# Patient Record
Sex: FEMALE | Race: WHITE | NOT HISPANIC OR LATINO | Employment: PART TIME | URBAN - METROPOLITAN AREA
[De-identification: names, ages, dates, MRNs, and addresses within clinical notes are randomized per-mention and may not be internally consistent; named-entity substitution may affect disease eponyms.]

---

## 2022-04-30 ENCOUNTER — APPOINTMENT (EMERGENCY)
Dept: RADIOLOGY | Facility: HOSPITAL | Age: 52
End: 2022-04-30

## 2022-04-30 ENCOUNTER — HOSPITAL ENCOUNTER (EMERGENCY)
Facility: HOSPITAL | Age: 52
Discharge: HOME | End: 2022-04-30
Attending: EMERGENCY MEDICINE | Admitting: EMERGENCY MEDICINE

## 2022-04-30 VITALS
HEART RATE: 58 BPM | OXYGEN SATURATION: 99 % | SYSTOLIC BLOOD PRESSURE: 98 MMHG | DIASTOLIC BLOOD PRESSURE: 59 MMHG | RESPIRATION RATE: 18 BRPM | TEMPERATURE: 98.3 F

## 2022-04-30 DIAGNOSIS — M25.552 LEFT HIP PAIN: Primary | ICD-10-CM

## 2022-04-30 PROCEDURE — 99284 EMERGENCY DEPT VISIT MOD MDM: CPT | Performed by: EMERGENCY MEDICINE

## 2022-04-30 PROCEDURE — 99283 EMERGENCY DEPT VISIT LOW MDM: CPT

## 2022-04-30 PROCEDURE — 73502 X-RAY EXAM HIP UNI 2-3 VIEWS: CPT

## 2022-04-30 RX ORDER — KETOROLAC TROMETHAMINE 30 MG/ML
15 INJECTION, SOLUTION INTRAMUSCULAR; INTRAVENOUS ONCE
Status: DISCONTINUED | OUTPATIENT
Start: 2022-04-30 | End: 2022-04-30 | Stop reason: HOSPADM

## 2022-04-30 RX ORDER — IBUPROFEN 600 MG/1
600 TABLET ORAL ONCE
Status: COMPLETED | OUTPATIENT
Start: 2022-04-30 | End: 2022-04-30

## 2022-04-30 RX ADMIN — IBUPROFEN 600 MG: 600 TABLET ORAL at 19:48

## 2022-04-30 NOTE — ED PROVIDER NOTES
History  Chief Complaint   Patient presents with    Hip Pain     pt presents ambulatory with c/o L hip pain x 6 months, worse today  denies dysuria, nausea     64-year-old female with no significant past medical history presents with left hip pain  Patient reports that she has been having this pain for 6 months  She had no inciting injury or event that started this pain  Patient reports that she does not do heavy lifting  She is not on her feet too often  She has no pain radiating anywhere else  She states that the pain is constant  No numbness or tingling in the legs  Patient has no low back pain  Patient states that movement makes the pain worse  She has not tried any over-the-counter medications for her symptoms  Patient has not seen any other doctor about this or had any kind of imaging yet  Patient cannot think of any reason that made her decide to come in today in particular  None       History reviewed  No pertinent past medical history  Past Surgical History:   Procedure Laterality Date    APPENDECTOMY         History reviewed  No pertinent family history  I have reviewed and agree with the history as documented  E-Cigarette/Vaping     E-Cigarette/Vaping Substances     Social History     Tobacco Use    Smoking status: Never Smoker    Smokeless tobacco: Never Used   Substance Use Topics    Alcohol use: Not Currently    Drug use: Not Currently        Review of Systems   Constitutional: Negative for chills, fatigue and fever  HENT: Negative for congestion, rhinorrhea and sore throat  Eyes: Negative for pain and redness  Respiratory: Negative for cough, chest tightness, shortness of breath and wheezing  Cardiovascular: Negative for chest pain and palpitations  Gastrointestinal: Negative for abdominal pain, diarrhea, nausea and vomiting  Endocrine: Negative  Genitourinary: Negative for difficulty urinating and hematuria     Musculoskeletal: Positive for arthralgias  Negative for back pain, gait problem and myalgias  Skin: Negative for pallor and rash  Allergic/Immunologic: Negative  Neurological: Negative for dizziness, weakness, light-headedness and headaches  Hematological: Negative  Physical Exam  ED Triage Vitals   Temperature Pulse Respirations Blood Pressure SpO2   04/30/22 1927 04/30/22 1927 04/30/22 1927 04/30/22 1927 04/30/22 1927   98 3 °F (36 8 °C) 58 18 98/59 99 %      Temp Source Heart Rate Source Patient Position - Orthostatic VS BP Location FiO2 (%)   04/30/22 1927 04/30/22 1927 -- -- --   Oral Monitor         Pain Score       04/30/22 1943       6             Orthostatic Vital Signs  Vitals:    04/30/22 1927   BP: 98/59   Pulse: 58       Physical Exam  Vitals and nursing note reviewed  Constitutional:       General: She is not in acute distress  Appearance: Normal appearance  She is not ill-appearing  HENT:      Head: Normocephalic and atraumatic  Eyes:      Conjunctiva/sclera: Conjunctivae normal    Cardiovascular:      Rate and Rhythm: Normal rate and regular rhythm  Pulmonary:      Effort: Pulmonary effort is normal  No respiratory distress  Musculoskeletal:         General: Normal range of motion  Cervical back: Normal range of motion and neck supple  Comments: No pain over the lumbar spine  Normal range of motion of the lumbar spine  Patient has pain over the left iliac crest and ASIS  No pain over the greater trochanter of the femur  No groin pain  Patient has no pain with range of motion of the hip  Right hip normal    Skin:     General: Skin is warm and dry  Neurological:      General: No focal deficit present  Mental Status: She is alert and oriented to person, place, and time  Sensory: No sensory deficit  Motor: No weakness           ED Medications  Medications   ibuprofen (MOTRIN) tablet 600 mg (600 mg Oral Given 4/30/22 1948)       Diagnostic Studies  Results Reviewed     None XR hip/pelv 2-3 vws left   ED Interpretation by Benjamin Lin DO (04/30 2059)   No acute fracture      Final Result by Isha Capellan MD (05/01 1054)      No acute osseous abnormality  Workstation performed: [id number]               Procedures  Procedures      ED Course  ED Course as of 05/03/22 1549   Sat Apr 30, 2022 1945 Pt refused toradol                             SBIRT 22yo+      Most Recent Value   SBIRT (24 yo +)    In order to provide better care to our patients, we are screening all of our patients for alcohol and drug use  Would it be okay to ask you these screening questions? No Filed at: 04/30/2022 1947                [facility]  Number of Diagnoses or Management Options  Left hip pain: established and improving  Diagnosis management comments: 52-year-old female presents with left hip pain  Will try NSAIDs and get an x-ray  Patient will likely need to follow-up with orthopedics  Amount and/or Complexity of Data Reviewed  Tests in the radiology section of CPT®: ordered and reviewed  Review and summarize past medical records: yes  Discuss the patient with other providers: yes    Risk of Complications, Morbidity, and/or Mortality  Presenting problems: minimal  Diagnostic procedures: minimal  Management options: minimal    Patient Progress  Patient progress: improved    Patient felt better after medications  Recommend follow up with Orthopedics  Continue NSAIDs  Return precautions given  All questions answered  Disposition  Final diagnoses:   Left hip pain     Time reflects when diagnosis was documented in both MDM as applicable and the Disposition within this note     Time User Action Codes Description Comment    4/30/2022  8:59 PM Qian Mills Add [M25 552] Left hip pain       ED Disposition     ED Disposition Condition Date/Time Comment    Discharge Good Sat Apr 30, 2022  8:59 PM Joselyn Herrera discharge to home/self care              Follow-up Information Follow up With Specialties Details Why Contact Info Additional [address] [facility]   [address] [id number] [address], [address] [place] [address], [address]  Use Entrance A           There are no discharge medications for this patient  No discharge procedures on file  PDMP Review     None           ED Provider  Attending physically available and evaluated Yohana Chiu I managed the patient along with the ED Attending      Electronically Signed by         Tucker Gamble DO  05/03/22 5464

## 2022-04-30 NOTE — ED ATTENDING ATTESTATION
4/30/2022  ILeon MD, saw and evaluated the patient  I have discussed the patient with the resident/non-physician practitioner and agree with the resident's/non-physician practitioner's findings, Plan of Care, and MDM as documented in the resident's/non-physician practitioner's note, except where noted  All available labs and Radiology studies were reviewed  I was present for key portions of any procedure(s) performed by the resident/non-physician practitioner and I was immediately available to provide assistance  At this point I agree with the current assessment done in the Emergency Department  I have conducted an independent evaluation of this patient a history and physical is as follows:    6m of L hip pain  Over ASIS, no meds  Decided to come today for eval - no acute exacerbation  No neurologic deficits  VS and nursing notes reviewed  General: Appears in NAD, awake, alert, speaking normally in full sentences  Well-nourished, well-developed  Appears stated age  Head: Normocephalic, atraumatic  Eyes: EOMI  Vision grossly normal  No subconjunctival hemorrhages or occular discharge noted  Symmetrical lids  ENT: Atraumatic external nose and ears  No stridor  Normal phonation  No drooling  Normal swallowing  Neck: No JVD  FROM  No goiter  CV: No pallor  Normal rate  Lungs: No tachypnea  No respiratory distress  MSK: Moving all extremities equally, no peripheral edema tenderness over the L iliac crest, pain with ROM of the back  Skin: Dry, intact  No cyanosis  Neuro: Awake, alert, GCS15  CN II-XII grossly intact  Grossly normal gait  Psychiatric/Behavioral: Appropriate mood and affect  A/P: This is a 49 y o  female who presents to the ED for evaluation of lateral pelvic pain  It is not unstable, there is no hip pain  NSAIDs, XR, ortho      ED Course         Critical Care Time  Procedures

## 2022-05-01 NOTE — DISCHARGE INSTRUCTIONS
You have been seen for hip pain hip pain  You should return to the ED if you develop inability to walk or other worsening symptoms  Follow up with Orthopedics  Take NSAIDs such as Motrin, Aleve, or Advil for the pain

## 2022-05-02 ENCOUNTER — HOSPITAL ENCOUNTER (EMERGENCY)
Facility: HOSPITAL | Age: 52
Discharge: HOME | End: 2022-05-03
Attending: EMERGENCY MEDICINE

## 2022-05-02 VITALS
HEART RATE: 91 BPM | RESPIRATION RATE: 20 BRPM | DIASTOLIC BLOOD PRESSURE: 71 MMHG | SYSTOLIC BLOOD PRESSURE: 132 MMHG | OXYGEN SATURATION: 100 %

## 2022-05-02 DIAGNOSIS — L02.91 ABSCESS: Primary | ICD-10-CM

## 2022-05-02 PROCEDURE — 99282 EMERGENCY DEPT VISIT SF MDM: CPT

## 2022-05-02 PROCEDURE — 99284 EMERGENCY DEPT VISIT MOD MDM: CPT | Performed by: EMERGENCY MEDICINE

## 2022-05-02 RX ORDER — CEPHALEXIN 500 MG/1
500 CAPSULE ORAL EVERY 6 HOURS SCHEDULED
Qty: 28 CAPSULE | Refills: 0 | Status: SHIPPED | OUTPATIENT
Start: 2022-05-02 | End: 2022-05-09

## 2022-05-02 RX ORDER — SULFAMETHOXAZOLE AND TRIMETHOPRIM 800; 160 MG/1; MG/1
1 TABLET ORAL ONCE
Status: COMPLETED | OUTPATIENT
Start: 2022-05-02 | End: 2022-05-02

## 2022-05-02 RX ORDER — CEPHALEXIN 500 MG/1
500 CAPSULE ORAL ONCE
Status: COMPLETED | OUTPATIENT
Start: 2022-05-02 | End: 2022-05-02

## 2022-05-02 RX ORDER — SULFAMETHOXAZOLE AND TRIMETHOPRIM 800; 160 MG/1; MG/1
1 TABLET ORAL 2 TIMES DAILY
Qty: 14 TABLET | Refills: 0 | Status: SHIPPED | OUTPATIENT
Start: 2022-05-02 | End: 2022-05-09

## 2022-05-02 RX ADMIN — SULFAMETHOXAZOLE AND TRIMETHOPRIM 1 TABLET: 800; 160 TABLET ORAL at 23:36

## 2022-05-02 RX ADMIN — CEPHALEXIN 500 MG: 500 CAPSULE ORAL at 23:36

## 2022-05-02 NOTE — Clinical Note
Yohana Rosasmajor was seen and treated in our emergency department on 5/2/2022  Diagnosis:     Cristino Cooper  may return to work on return date  She may return on this date: 05/05/2022         If you have any questions or concerns, please don't hesitate to call        Saida Garcia PA-C    ______________________________           _______________          _______________  Hospital Representative                              Date                                Time

## 2022-05-03 NOTE — DISCHARGE INSTRUCTIONS
You were seen in the ED for abscess  Return to the ED for any worsening symptoms or new symptoms  Follow up with your primary care doctor as soon as possible

## 2022-05-03 NOTE — ED PROVIDER NOTES
History  Chief Complaint   Patient presents with    Cyst     patient reports having a cyst to right lower abdomen  started with yellow drainage today  redness around site for three days  78-year-old female patient presenting with skin abscess on right abdomen onset 3 days ago  Patient states that she noticed a "black dot" with swelling 3 days ago  Patient states that her son tried to squeeze without any drainage  Patient states erythema swelling worsened and has been spreading  States started draining serous drainage today  Patient states worsening pain  Denies taking anything for the pain  Patient is unsure how she initially got the initial wound  Denies chest pain, fevers, shortness of breath, nausea, vomiting, chills  None       History reviewed  No pertinent past medical history  Past Surgical History:   Procedure Laterality Date    APPENDECTOMY         History reviewed  No pertinent family history  I have reviewed and agree with the history as documented  E-Cigarette/Vaping     E-Cigarette/Vaping Substances     Social History     Tobacco Use    Smoking status: Never Smoker    Smokeless tobacco: Never Used   Substance Use Topics    Alcohol use: Not Currently    Drug use: Not Currently        Review of Systems   Constitutional: Negative for chills, fatigue and fever  HENT: Negative for congestion, rhinorrhea and sore throat  Respiratory: Negative for cough, chest tightness and shortness of breath  Cardiovascular: Negative for chest pain and leg swelling  Gastrointestinal: Negative for abdominal distention, abdominal pain, diarrhea, nausea and vomiting  Genitourinary: Negative for difficulty urinating and dysuria  Musculoskeletal: Negative for arthralgias, back pain and myalgias  Skin: Positive for wound (Right abdominal abscess with surrounding cellulitis  )  Negative for rash  Neurological: Negative for dizziness, weakness and headaches     All other systems reviewed and are negative  Physical Exam  ED Triage Vitals [05/02/22 2202]   Temp Pulse Respirations Blood Pressure SpO2   -- 91 20 132/71 100 %      Temp src Heart Rate Source Patient Position - Orthostatic VS BP Location FiO2 (%)   -- Monitor Sitting Left arm --      Pain Score       10 - Worst Possible Pain             Orthostatic Vital Signs  Vitals:    05/02/22 2202   BP: 132/71   Pulse: 91   Patient Position - Orthostatic VS: Sitting       Physical Exam  Vitals reviewed  Constitutional:       Appearance: Normal appearance  HENT:      Head: Normocephalic and atraumatic  Nose: Nose normal       Mouth/Throat:      Mouth: Mucous membranes are moist       Pharynx: Oropharynx is clear  Eyes:      Extraocular Movements: Extraocular movements intact  Conjunctiva/sclera: Conjunctivae normal    Cardiovascular:      Rate and Rhythm: Normal rate and regular rhythm  Pulses: Normal pulses  Heart sounds: Normal heart sounds  Pulmonary:      Effort: Pulmonary effort is normal       Breath sounds: Normal breath sounds  Abdominal:      General: Bowel sounds are normal       Palpations: Abdomen is soft  Tenderness: There is no abdominal tenderness  Musculoskeletal:         General: Swelling (swelling of wound) and tenderness present  Normal range of motion  Cervical back: Normal range of motion  Skin:     General: Skin is warm and dry  Findings: Erythema (erythema surrounding abscess with indurated skin) and lesion (right abdominal skin abscess with mild serous drainage 3 cm with surrunding indurated erythema spanning to midline  ) present  Neurological:      General: No focal deficit present  Mental Status: She is alert and oriented to person, place, and time  Mental status is at baseline           ED Medications  Medications   sulfamethoxazole-trimethoprim (BACTRIM DS) 800-160 mg per tablet 1 tablet (1 tablet Oral Given 5/2/22 4503)   cephalexin (KEFLEX) capsule 500 mg (500 mg Oral Given 5/2/22 2336)       Diagnostic Studies  Results Reviewed     None                 No orders to display         Procedures  Procedures      ED Course  ED Course as of 05/03/22 0252   Mon May 02, 2022   2336 Patient refusing I & D, does not want  Spoke with patient about risks of not getting I & D and possibility of worsening infection and sepsis  Patient still refusing                                       MDM  Number of Diagnoses or Management Options  Abscess  Diagnosis management comments: 81-year-old female patient presenting with skin abscess on right abdomen  No fevers  Started draining today  On exam, patient has cm skin abscess with serous drainage with surrounding erythema and induration tracking to midline  Spoke with patient about I&D of abscess however patient declining  Spoke with patient about risks such as sepsis, death however patient declining I&D and wants antibiotics  Patient treated with Bactrim and Keflex and discharged with Bactrim and Keflex  Spoke with patient about return precautions such as worsening infection, fevers  Patient understands the risks of worsening infection without I and D and agrees to come back if worsens  Amount and/or Complexity of Data Reviewed  Clinical lab tests: ordered and reviewed        Disposition  Final diagnoses:   Abscess     Time reflects when diagnosis was documented in both MDM as applicable and the Disposition within this note     Time User Action Codes Description Comment    5/2/2022 11:54 PM Kitty ZULUAGA [facility] Add [L02 91] Abscess       ED Disposition     ED Disposition Condition Date/Time Comment    Discharge Stable Mon May 2, 2022 11:54 PM Esteban Postin discharge to home/self care              Follow-up Information     Follow up With Specialties Details Why Contact Info Additional [address]  [facility] Schedule an appointment as soon as possible for a visit   [address] [phone]  [facility], [address] [place], [place], [facility]          Discharge Medication List as of 5/2/2022 11:58 PM      START taking these medications    Details   cephalexin (KEFLEX) 500 mg capsule Take 1 capsule (500 mg total) by mouth every 6 (six) hours for 7 days, Starting Mon 5/2/2022, Until Mon 5/9/2022, Normal      sulfamethoxazole-trimethoprim (BACTRIM DS) 800-160 mg per tablet Take 1 tablet by mouth 2 (two) times a day for 7 days smx-tmp DS (BACTRIM) 800-160 mg tabs (1tab q12 D10), Starting Mon 5/2/2022, Until Mon 5/9/2022, Normal               PDMP Review     None           ED Provider  Attending physically available and evaluated Ofe Olvera I managed the patient along with the ED Attending      Electronically Signed by         Chen Mandujano MD  05/03/22 2986

## 2022-05-04 NOTE — ED ATTENDING ATTESTATION
5/2/2022  IJessica DO, saw and evaluated the patient  I have discussed the patient with the resident/non-physician practitioner and agree with the resident's/non-physician practitioner's findings, Plan of Care, and MDM as documented in the resident's/non-physician practitioner's note, except where noted  All available labs and Radiology studies were reviewed  I was present for key portions of any procedure(s) performed by the resident/non-physician practitioner and I was immediately available to provide assistance  At this point I agree with the current assessment done in the Emergency Department  I have conducted an independent evaluation of this patient a history and physical is as follows:    49 y o  female presents abscess on abdomen, right lower quadrant  Onset  days  Described as initially a black dot, now swelling  Erythema, indurated  Draining somewhat Denies f/c  No other modifying factors  Moderate severity  No other associated symptoms  Normal physical exam other than pertinent findings below  Large indurated area c/w abscess of the abdominal wall, no peritoneal irritation        A/P: Abscess, needs I/D but pt multiple times refused and wanted to try abx first even though I informed her that this would likely not result in resolution   She agrees to come back if her sx worsen                    ED Course         Critical Care Time  Procedures

## 2023-07-01 ENCOUNTER — HOSPITAL ENCOUNTER (EMERGENCY)
Facility: HOSPITAL | Age: 53
Discharge: HOME | End: 2023-07-01
Attending: EMERGENCY MEDICINE | Admitting: EMERGENCY MEDICINE
Payer: COMMERCIAL

## 2023-07-01 VITALS
DIASTOLIC BLOOD PRESSURE: 57 MMHG | SYSTOLIC BLOOD PRESSURE: 118 MMHG | RESPIRATION RATE: 18 BRPM | OXYGEN SATURATION: 98 % | TEMPERATURE: 98.6 F | HEART RATE: 98 BPM

## 2023-07-01 DIAGNOSIS — L03.317 CELLULITIS AND ABSCESS OF BUTTOCK: Primary | ICD-10-CM

## 2023-07-01 DIAGNOSIS — L02.31 CELLULITIS AND ABSCESS OF BUTTOCK: Primary | ICD-10-CM

## 2023-07-01 PROCEDURE — 99282 EMERGENCY DEPT VISIT SF MDM: CPT

## 2023-07-01 RX ORDER — CEPHALEXIN 500 MG/1
500 CAPSULE ORAL ONCE
Status: COMPLETED | OUTPATIENT
Start: 2023-07-01 | End: 2023-07-01

## 2023-07-01 RX ORDER — SULFAMETHOXAZOLE AND TRIMETHOPRIM 800; 160 MG/1; MG/1
1 TABLET ORAL ONCE
Status: COMPLETED | OUTPATIENT
Start: 2023-07-01 | End: 2023-07-01

## 2023-07-01 RX ORDER — CEPHALEXIN 500 MG/1
500 CAPSULE ORAL EVERY 6 HOURS SCHEDULED
Qty: 28 CAPSULE | Refills: 0 | Status: SHIPPED | OUTPATIENT
Start: 2023-07-01 | End: 2023-07-08

## 2023-07-01 RX ORDER — SULFAMETHOXAZOLE AND TRIMETHOPRIM 800; 160 MG/1; MG/1
1 TABLET ORAL 2 TIMES DAILY
Qty: 14 TABLET | Refills: 0 | Status: SHIPPED | OUTPATIENT
Start: 2023-07-01 | End: 2023-07-08

## 2023-07-01 RX ADMIN — SULFAMETHOXAZOLE AND TRIMETHOPRIM 1 TABLET: 800; 160 TABLET ORAL at 17:34

## 2023-07-01 RX ADMIN — CEPHALEXIN 500 MG: 500 CAPSULE ORAL at 17:34

## 2023-07-01 NOTE — DISCHARGE INSTRUCTIONS
Please return to the ER for re-evaluation in 48-72 hours. As discussed you REALLY should have it incised and drained (scalpel to open). Please do sitz baths as discussed. Please return for worsening symptoms, expansion of redness.

## 2023-07-01 NOTE — Clinical Note
Dolroes Shah was seen and treated in our emergency department on 7/1/2023. No restrictions            Diagnosis: Skin evaluation    Mary Manzo  . She may return on this date: 07/03/2023         If you have any questions or concerns, please don't hesitate to call.       Vilma Lemon MD    ______________________________           _______________          _______________  Hospital Representative                              Date                                Time

## 2023-07-01 NOTE — ED ATTENDING ATTESTATION
Final Diagnoses:     1. Cellulitis and abscess of buttock           I, Willene Prader MD, saw and evaluated the patient. All available labs and X-rays were ordered by me or the resident / non-physician and have been reviewed by myself. I discussed the patient with the resident / non-physician and agree with the resident's / non-physician practitioner's findings and plan as documented in the resident's / non-physician practicitioner's note, except where noted. At this point, I agree with the current assessment done in the ED. I was present during key portions of all procedures performed unless otherwise stated. Nursing Triage:     Chief Complaint   Patient presents with   • Spider Bite     Per pt bite on right side of buttocks. Pt reports chills. She was at work on Tuesday and felt a bite. She reports 2 spots with hardness around the area       HPI:   This is a 47 y.o. female presenting for evaluation of "spider bite"  Sharp pain on right buttock, thought something bit her there. It's gotten larger. Gotten red/hot. Localized discomfort. No drainage. ASSESSMENT + PLAN:   Abscess w/ cellulitis:  - U/S to see if valid site for I&D ? images saved in PACS; done by myself and resident ? obvious abscess present. - Abx for staph given high suspicion given purulence. - She refused I&D. Had extensive discussion about why this is not ideal  - RTER 48 hours  - DC. Physical:   General: VS reviewed  Appears in NAD  awake, alert. Well-nourished, well-developed. Appears stated age. Speaking normally in full sentences. Head: Normocephalic, atraumatic  Eyes: EOM-I. No diplopia. No hyphema. No subconjunctival hemorrhages. Symmetrical lids. ENT: Atraumatic external nose and ears. MMM  No malocclusion. No stridor. Normal phonation. No drooling. Normal swallowing. Neck: No JVD. CV: No pallor noted  Lungs:   No tachypnea  No respiratory distress  MSK:   FROM spontaneously  Skin: Dry, intact. Right buttock 12cm area of red, firm, indurated. Neuro: Awake, alert, GCS15, CN II-XII grossly intact. Motor grossly intact. Psychiatric/Behavioral: interacting normally; appropriate mood/affect.  Exam: deferred    Vitals:    07/01/23 1552   BP: 118/57   BP Location: Right arm   Pulse: 98   Resp: 18   Temp: 98.6 °F (37 °C)   TempSrc: Temporal   SpO2: 98%     - There are no obvious limitations to social determinants of care. - Nursing note reviewed. - Vitals reviewed. - Orders placed by myself and/or advanced practitioner / resident.    - Previous chart was reviewed  - No language barrier.   - History obtained from patient. - There are no limitations to the history obtained:     Past Medical:    has no past medical history on file. Past Surgical:    has a past surgical history that includes Appendectomy. Social:     Social History     Substance and Sexual Activity   Alcohol Use Not Currently     Social History     Tobacco Use   Smoking Status Never   Smokeless Tobacco Never     Social History     Substance and Sexual Activity   Drug Use Not Currently       Echo:   No results found for this or any previous visit. No results found for this or any previous visit. Cath:    No results found for this or any previous visit. Code Status: No Order  Advance Directive and Living Will:      Power of :    POLST:    Medications - No data to display  No orders to display     No orders of the defined types were placed in this encounter.     Labs Reviewed - No data to display  Time reflects when diagnosis was documented in both MDM as applicable and the Disposition within this note     Time User Action Codes Description Comment    7/1/2023  5:19 PM Suraj Leong [L02.31,  N27.596] Cellulitis and abscess of buttock       ED Disposition     ED Disposition   Discharge    Condition   Stable    Date/Time   Sat Jul 1, 2023  5:19 PM    Comment   Rd Watson discharge to home/self care.               Follow-up Information     Follow up With Specialties Details Why Contact Info Additional [address] [facility] Emergency Department Emergency Medicine Go to  If symptoms worsen [address] [phone]  [facility] Emergency Department, [address] [place], [place], [address] Emergency Department Emergency Medicine Go in 2 days If symptoms worsen, For re-evaluation [address] [id number]  [facility] Emergency Department, [address] [place], [place], [address] [phone]        Patient's Medications    No medications on file     No discharge procedures on file. None                        Portions of the record may have been created with voice recognition software. Occasional wrong word or "sound a like" substitutions may have occurred due to the inherent limitations of voice recognition software. Read the chart carefully and recognize, using context, where substitutions have occurred.     Electronically signed by:  Krish Perez

## 2023-07-01 NOTE — Clinical Note
Serena Vidal was seen and treated in our emergency department on 7/1/2023. No restrictions            Diagnosis: Skin evaluation    Denilson Neelima  . She may return on this date: 07/03/2023         If you have any questions or concerns, please don't hesitate to call.       Benita Berumen MD    ______________________________           _______________          _______________  Hospital Representative                              Date                                Time

## 2023-07-01 NOTE — ED PROVIDER NOTES
History  Chief Complaint   Patient presents with   • Spider Bite     Per pt bite on right side of buttocks. Pt reports chills. She was at work on Tuesday and felt a bite. She reports 2 spots with hardness around the area     46 y/o F present with 4 day hx of worsening R buttock pain. Sm states she thought she was bitten by a spider 4 days ago but did not see an insect. She note the area to be progressively more painful, red, tender, and enlarging. She is now unable to sit without pain. She has not tried anything for the pain and has not been seen by a healthcare provider until today. None       History reviewed. No pertinent past medical history. Past Surgical History:   Procedure Laterality Date   • APPENDECTOMY         History reviewed. No pertinent family history. I have reviewed and agree with the history as documented. E-Cigarette/Vaping     E-Cigarette/Vaping Substances     Social History     Tobacco Use   • Smoking status: Never   • Smokeless tobacco: Never   Substance Use Topics   • Alcohol use: Not Currently   • Drug use: Not Currently        Review of Systems   Constitutional: Negative for appetite change, chills, fatigue and fever. Respiratory: Negative for shortness of breath and wheezing. Cardiovascular: Negative for chest pain. Genitourinary: Negative for dysuria, flank pain, frequency and pelvic pain. Musculoskeletal: Negative for back pain and joint swelling. Skin: Positive for color change. Neurological: Negative for weakness and numbness.        Physical Exam  ED Triage Vitals [07/01/23 1552]   Temperature Pulse Respirations Blood Pressure SpO2   98.6 °F (37 °C) 98 18 118/57 98 %      Temp Source Heart Rate Source Patient Position - Orthostatic VS BP Location FiO2 (%)   Temporal Monitor Sitting Right arm --      Pain Score       9             Orthostatic Vital Signs  Vitals:    07/01/23 1552   BP: 118/57   Pulse: 98   Patient Position - Orthostatic VS: Sitting Physical Exam  Constitutional:       General: She is not in acute distress. Appearance: Normal appearance. She is not toxic-appearing. Pulmonary:      Effort: Pulmonary effort is normal. No respiratory distress. Abdominal:      General: Abdomen is flat. There is no distension. Skin:         Neurological:      Mental Status: She is alert. ED Medications  Medications   cephalexin (KEFLEX) capsule 500 mg (500 mg Oral Given 7/1/23 1734)   sulfamethoxazole-trimethoprim (BACTRIM DS) 800-160 mg per tablet 1 tablet (1 tablet Oral Given 7/1/23 1734)       Diagnostic Studies  Results Reviewed     None                 No orders to display         Procedures  POC MSK/Soft Tissue US    Date/Time: 7/1/2023 5:38 PM    Performed by: Kelvin Sanchez MD  Authorized by: Kelvin Sanchez MD    Patient location:  ED  Performed by: Attending  Other Assisting Provider: Yes (comment)    Procedure:     Performed: soft tissue ultrasound    Procedure details:     Exam Type:  Diagnostic    Longitudinal view:  Obtained    Transverse view:  Obtained    Image quality: limited diagnostic      Image availability:  Not saved  Soft tissue ultrasound:     Soft tissue indications: suspected abscess      Anatomic location:  Buttock    Soft tissue findings: subcutaneous collection (comment)      Collection size (cm):  5  Interpretation:     Soft tissue impressions: consistent with abscess            ED Course                             SBIRT 22yo+    Flowsheet Row Most Recent Value   Initial Alcohol Screen: US AUDIT-C     1. How often do you have a drink containing alcohol? 0 Filed at: 07/01/2023 1554   2. How many drinks containing alcohol do you have on a typical day you are drinking? 0 Filed at: 07/01/2023 1554   3b. FEMALE Any Age, or MALE 65+: How often do you have 4 or more drinks on one occassion?  0 Filed at: 07/01/2023 1554   Audit-C Score 0 Filed at: 07/01/2023 1554                Medical Decision Making  Patient presents with hx and examination findings consistent with abscess vs cellulitis. US confirmed presence of abscess of R buttock. Patient was educated regarding proper treatment which would be abscess I and D. She refused and elected to undergo abx therapy. Although not recommended treatment, per patient request, appropriate abx were prescribed; one dos ein the ED, the rest were called into a local pharmacy. SM stable for DC to home. She was directed to return to ED or seek immediate care if the abscess does not improve in the next 72 hours to which she verbalized understanding. Risk  Prescription drug management. Disposition  Final diagnoses:   Cellulitis and abscess of buttock     Time reflects when diagnosis was documented in both MDM as applicable and the Disposition within this note     Time User Action Codes Description Comment    7/1/2023  5:19 PM Ottoniel Rush [L02.31,  L03.317] Cellulitis and abscess of buttock       ED Disposition     ED Disposition   Discharge    Condition   Stable    Date/Time   Mon Jul 3, 2023  6:39 PM    Comment   Ana Callejas discharge to home/self care.                Follow-up Information     Follow up With Specialties Details Why Contact Info Additional [address] [facility] Emergency Department Emergency Medicine Go to  If symptoms worsen [address] [id number]  [facility] Emergency Department, [address] [place], [place], [address] [facility] Emergency Department Emergency Medicine Go in 2 days If symptoms worsen, For re-evaluation [address] [id number]  [facility] Emergency Department, [address] [facility], [place], [place], [facility]          Discharge Medication List as of 7/1/2023  5:24 PM      START taking these medications    Details   cephalexin [facility]) 500 mg capsule Take 1 capsule (500 mg total) by mouth every 6 (six) hours for 7 days, Starting Sat 7/1/2023, Until Sat 7/8/2023, Normal      sulfamethoxazole-trimethoprim (BACTRIM DS) 800-160 mg per tablet Take 1 tablet by mouth 2 (two) times a day for 7 days smx-tmp DS (BACTRIM) 800-160 mg tabs (1tab q12 D10), Starting Sat 7/1/2023, Until Sat 7/8/2023, Normal           No discharge procedures on file. PDMP Review     None           ED Provider  Attending physically available and evaluated Ana Callejas. I managed the patient along with the ED Attending.     Electronically Signed by         Erika Grider MD  07/01/23 468 Carlos Montgomery MD  07/03/23 112 HAN Walker MD  07/04/23 1305

## 2023-08-17 ENCOUNTER — VBI (OUTPATIENT)
Dept: ADMINISTRATIVE | Facility: OTHER | Age: 53
End: 2023-08-17

## 2024-08-05 ENCOUNTER — VBI (OUTPATIENT)
Dept: ADMINISTRATIVE | Facility: OTHER | Age: 54
End: 2024-08-05

## 2024-08-05 NOTE — TELEPHONE ENCOUNTER
08/05/24 7:30 AM     Chart reviewed for Pap Smear (HPV) aka Cervical Cancer Screening ; nothing is submitted to the patient's insurance at this time.     Lizette Alas   [facility]

## 2024-09-10 ENCOUNTER — VBI (OUTPATIENT)
Dept: ADMINISTRATIVE | Facility: OTHER | Age: 54
End: 2024-09-10

## 2024-09-10 NOTE — TELEPHONE ENCOUNTER
09/10/24 3:29 PM     Chart reviewed for CRC: Colonoscopy ; nothing is submitted to the patient's insurance at this time.     Lizette Alas   [facility]